# Patient Record
Sex: MALE | Race: BLACK OR AFRICAN AMERICAN | NOT HISPANIC OR LATINO | Employment: FULL TIME | URBAN - METROPOLITAN AREA
[De-identification: names, ages, dates, MRNs, and addresses within clinical notes are randomized per-mention and may not be internally consistent; named-entity substitution may affect disease eponyms.]

---

## 2018-01-11 NOTE — RESULT NOTES
Message   Remaining blood test results are back  Mono test, lyme disease test and throat culture are all negative  How is patient feeling? let me know, thanks     Verified Results  (1) THROAT CULTURE (CULTURE, UPPER RESPIRATORY) 22Sep2016 09:13PM [facility] Arianna     Test Name Result Flag Reference   CLINICAL REPORT (Report)     Test:        Throat culture  Specimen Type:   Throat  Specimen Date:   9/22/2016 9:13 PM  Result Date:    9/24/2016 9:30 AM  Result Status:   Final result  Resulting Lab:   [place] [address]            Tel: [phone]      CULTURE                                       ------------------                                   Negative for beta-hemolytic Streptococcus     (1) LYME ANTIBODY PROFILE W/REFLEX TO WESTERN BLOT 22Sep2016 05:20PM [facility] Arianna   TW Order Number: [id number]     Test Name Result Flag Reference   LYME IGG 0 12  0 00-0 79   NEGATIVE(0 00-0 79)-Absence of detectable Borrelia IgG Antibodies  A negative result does not exclude the possibility of Borrelia infection  If early Lyme disease is suspected,a second sample should be collected & tested 4 weeks after initial testing  LYME IGM 0 19  0 00-0 79   NEGATIVE (0 00-0 79)-Absence of detectable Borrelia IgM antibodies  A negative result does not exclude the possibility of Borrelia infection   If early lyme disease is suspected, a second sample should be collected & tested 4 weeks after initial testing      (1) MONO TEST 22Sep2016 05:20PM [facility] Arianna     Test Name Result Flag Reference   MONO TEST Negative  Negative

## 2018-01-11 NOTE — MISCELLANEOUS
Message   Recorded as Task   Date: 09/26/2016 09:00 AM, Created By: Jazzy Romero   Task Name: Call Patient with results   Assigned To: Jazzy Romero   Regarding Patient: Mora Boast, Status: In Progress   Comment:    Micky Sood - 26 Sep 2016 9:00 AM     Patient Phone: [phone]      Remaining blood test results are back  Mono test, lyme disease test and throat culture are all negative  How is patient feeling? let me know, thanks   Sheryl Heller - 26 Sep 2016 9:18 AM     UNDELEGATED TASK   Sheryl Heller - 26 Sep 2016 9:19 AM     TASK REASSIGNED: Previously Assigned To Taj Rod - 26 Sep 2016 10:01 AM     TASK IN PROGRESS   Gideon Car - 26 Sep 2016 10:01 AM     TASK EDITED  lmom to call back   Shabnam Crane - 26 Sep 2016 10:05 AM     TASK EDITED                 SPOKE W/ DAD AND ADV'D  DAD SAID HE COMPLAINED ON SAT  THAT HE WAS A BIT SLUGGISH   SAID HE NORMALY ISN'T TAHT ACTIVE THOUGH    called and spoke to patient    reviewed most recent test results with patient over phone    pt feeling the same - has little to no breaks 2nd to working and full-time school  feeling stressed "I thought it(stress) was the cause" & would like to take more breaks/rest time as he believes this will help him feel better  has discussed with parents in past but no time off scheduled in near future    not feeling depressed, no other complaints    plan - advised of need to take rest/breaks to recuperate, pt agrees   recommended Patient to schedule some time for rest in near future, pt will speak with parents and his work   advised if any concerns/questions - call or contact office anytime   pt verbalized understanding and agrees with plan/appreciative of phone call      Signatures   Electronically signed by : Roseanne Robertson DO; Sep 26 2016  2:42PM EST                       (Author)

## 2018-01-17 NOTE — PROGRESS NOTES
Chief Complaint  pt here for flu vaccine      Active Problems    1  Chronic pain of left ankle (046 42,656 10) (M25 572,G89 29)   2  Concussion With No Loss Of Consciousness (850 0)   3  Fatigue (780 79) (R53 83)   4  Feeling unwell (780 99) (R68 89)   5  Foot Sprain (845 10)   6  Injury, foot (959 7) (S99 929A)   7  Muscle ache (729 1) (M79 1)   8  Pain in left foot (729 5) (M79 672)   9  Screening for hyperlipidemia (V77 91) (Z13 220)    Current Meds   1  No Reported Medications Recorded    Allergies    1  No Known Drug Allergies    Assessment    1   Need for influenza vaccination (V04 81) (Z23)    Plan  Need for influenza vaccination    · Fluzone Quadrivalent Intramuscular Suspension    Signatures   Electronically signed by : Jayesh Salazar DO; Nov 16 2016  1:59PM EST                       (Author)

## 2018-04-30 ENCOUNTER — TELEPHONE (OUTPATIENT)
Dept: INTERNAL MEDICINE CLINIC | Facility: CLINIC | Age: 23
End: 2018-04-30

## 2018-04-30 ENCOUNTER — OFFICE VISIT (OUTPATIENT)
Dept: INTERNAL MEDICINE CLINIC | Facility: CLINIC | Age: 23
End: 2018-04-30
Payer: COMMERCIAL

## 2018-04-30 ENCOUNTER — TRANSCRIBE ORDERS (OUTPATIENT)
Dept: RADIOLOGY | Facility: HOSPITAL | Age: 23
End: 2018-04-30

## 2018-04-30 ENCOUNTER — HOSPITAL ENCOUNTER (OUTPATIENT)
Dept: RADIOLOGY | Facility: HOSPITAL | Age: 23
Discharge: HOME | End: 2018-04-30
Payer: COMMERCIAL

## 2018-04-30 VITALS
DIASTOLIC BLOOD PRESSURE: 72 MMHG | WEIGHT: 153.8 LBS | HEART RATE: 54 BPM | HEIGHT: 69 IN | SYSTOLIC BLOOD PRESSURE: 128 MMHG | OXYGEN SATURATION: 98 % | BODY MASS INDEX: 22.78 KG/M2 | RESPIRATION RATE: 16 BRPM

## 2018-04-30 DIAGNOSIS — M25.561 ACUTE PAIN OF RIGHT KNEE: Primary | ICD-10-CM

## 2018-04-30 DIAGNOSIS — M25.561 ACUTE PAIN OF RIGHT KNEE: ICD-10-CM

## 2018-04-30 PROCEDURE — 73564 X-RAY EXAM KNEE 4 OR MORE: CPT

## 2018-04-30 PROCEDURE — 99214 OFFICE O/P EST MOD 30 MIN: CPT | Performed by: INTERNAL MEDICINE

## 2018-04-30 RX ORDER — IBUPROFEN 400 MG/1
400 TABLET ORAL EVERY 12 HOURS
Qty: 30 TABLET | Refills: 0 | Status: SHIPPED | OUTPATIENT
Start: 2018-04-30 | End: 2018-08-29

## 2018-04-30 NOTE — LETTER
April 30, 2018     Patient: Vikki Eisenmenger II   YOB: 1995   Date of Visit: 4/30/2018       To Whom it May Concern:    Vikki Eisenmenger is under my professional care  He was seen in my office on 4/30/2018  Please excuse Jared Capellan from work on April 30, 2018 & May 1, 2018 for medical reasons  If you have any questions or concerns, please don't hesitate to call           Sincerely,          Andrés Alaniz, DO

## 2018-04-30 NOTE — PATIENT INSTRUCTIONS
1  Rest, analgesics  2  Motrin 400mg twice a day for 5 days -> take with food  3  Elastic knee brace  4  As pain is improving, try home stretching exercises as below  5  Call if not improving or if worsening    Knee Exercises   AMBULATORY CARE:   What you need to know about knee exercises:  Knee exercises help strengthen the muscles around your knee  Strong muscles can help reduce pain and decrease your risk of future injury  Knee exercises also help you heal after an injury or surgery  · Start slow  These are beginning exercises  Ask your healthcare provider if you need to see a physical therapist for more advanced exercises  As you get stronger, you may be able to do more sets of each exercise or add weights  · Stop if you feel pain  It is normal to feel some discomfort at first  Regular exercise will help decrease your discomfort over time  · Do the exercises on both legs  Do this so both knees remain strong  · Warm up before you do knee exercises  Walk or ride a stationary bike for 5 or 10 minutes to warm your muscles  How to perform knee stretches safely:  Always stretch before you do strengthening exercises  Do these stretching exercises again after you do the strengthening exercises  Do these stretches 4 or 5 days a week, or as directed  · Standing calf stretch: Face a wall and place both palms flat on the wall, or hold the back of a chair for balance  Keep a slight bend in your knees  Take a big step backward with one leg  Keep your other leg directly under you  Keep both heels flat and press your hips forward  Hold the stretch for 30 seconds, and then relax for 30 seconds  Switch legs  Repeat 2 or 3 times on each leg  · Standing quadriceps stretch:  Stand and place one hand against a wall or hold the back of a chair for balance  With your weight on one leg, bend your other leg and grab your ankle  Bring your heel toward your buttocks  Hold the stretch for 30 to 60 seconds  Switch legs  Repeat 2 or 3 times on each leg  · Sitting hamstring stretch:  Sit with both legs straight in front of you  Do not point or flex your toes  Place your palms on the floor and slide your hands forward until you feel the stretch  Do not round your back  Hold the stretch for 30 seconds  Repeat 2 or 3 times  How to perform knee strengthening exercises safely:  Do these exercises 4 or 5 days a week, or as directed  · Standing half squats:  Stand with your feet shoulder-width apart  Lean your back against a wall or hold the back of a chair for balance, if needed  Slowly sit down about 10 inches, as if you are going to sit in a chair  Your body weight should be mostly over your heels  Hold the squat for 5 seconds, then rise to a standing position  Do 3 sets of 10 squats to strengthen your buttocks and thighs  · Standing hamstring curls: Face a wall and place both palms flat on the wall, or hold the back of a chair for balance  With your weight on one leg, lift your other foot as close to your buttocks as you can  Hold for 5 seconds and then lower your leg  Do 2 sets of 10 curls on each leg  This exercise strengthens the muscles in the back of your thigh  · Standing calf raises:  Face a wall and place both palms flat on the wall, or hold the back of a chair for balance  Stand up straight, and do not lean  Place all your weight on one leg by lifting the other foot off the floor  Raise the heel of the foot that is on the floor as high as you can and then lower it  Do 2 sets of 10 calf raises on each leg to strengthen your calf muscles  · Straight leg lifts:  Lie on your stomach with straight legs  Fold your arms in front of you and rest your head in your arms  Tighten your leg muscles and raise one leg as high as you can  Hold for 5 seconds, then lower your leg  Do 2 sets of 10 lifts on each leg to strengthen your buttocks             · Sitting leg lifts:  Sit in a chair  Slowly straighten and raise one leg  Squeeze your thigh muscles and hold for 5 seconds  Relax and return your foot to the floor  Do 2 sets of 10 lifts on each leg  This helps strengthen the muscles in the front of your thigh  Contact your healthcare provider if:   · You have new pain or your pain becomes worse  · You have questions or concerns about your condition or care  © 2017 [address] [employer] Information is for End User's use only and may not be sold, redistributed or otherwise used for commercial purposes  All illustrations and images included in CareNotes® are the copyrighted property of [employer]  or Tyrese Morrow  The above information is an  only  It is not intended as medical advice for individual conditions or treatments  Talk to your doctor, nurse or pharmacist before following any medical regimen to see if it is safe and effective for you

## 2018-04-30 NOTE — TELEPHONE ENCOUNTER
----- Message from Melba Bradshaw DO sent at 4/30/2018  4:10 PM EDT -----  Knee x-ray results are back and look fine, no degenerative changes or injury    Continue with current tx plan

## 2018-08-29 ENCOUNTER — OFFICE VISIT (OUTPATIENT)
Dept: INTERNAL MEDICINE CLINIC | Facility: CLINIC | Age: 23
End: 2018-08-29
Payer: COMMERCIAL

## 2018-08-29 VITALS
OXYGEN SATURATION: 97 % | DIASTOLIC BLOOD PRESSURE: 80 MMHG | RESPIRATION RATE: 16 BRPM | HEART RATE: 70 BPM | HEIGHT: 69 IN | TEMPERATURE: 98.7 F | BODY MASS INDEX: 22.07 KG/M2 | SYSTOLIC BLOOD PRESSURE: 126 MMHG | WEIGHT: 149 LBS

## 2018-08-29 DIAGNOSIS — Z13.6 ENCOUNTER FOR LIPID SCREENING FOR CARDIOVASCULAR DISEASE: ICD-10-CM

## 2018-08-29 DIAGNOSIS — Z13.220 ENCOUNTER FOR LIPID SCREENING FOR CARDIOVASCULAR DISEASE: ICD-10-CM

## 2018-08-29 DIAGNOSIS — Z02.89 ENCOUNTER FOR COMPLETION OF FORM WITH PATIENT: ICD-10-CM

## 2018-08-29 DIAGNOSIS — Z00.00 WELLNESS EXAMINATION: Primary | ICD-10-CM

## 2018-08-29 PROCEDURE — 99395 PREV VISIT EST AGE 18-39: CPT | Performed by: INTERNAL MEDICINE

## 2018-08-29 NOTE — PROGRESS NOTES
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, went to [facility] today and told needs form completed, called office and scheduled for [place] this morning  Knee is feeling better no more pain, took excedrin and not motrin  Starting college in fall and may need medical letter stating he can return to school  Was at [employer] and finished his associate's degree but left during semester of last term, reports due to being too focused on working & making money(had 2-3 jobs at time of school)  Denies hx of seizure, alcohol problems, depression/DM  No other complaints  History reviewed  No pertinent past medical history  Vitals:    08/29/18 0942   BP: 126/80   Pulse: 70   Resp: 16   Temp: 98 7 °F (37 1 °C)   SpO2: 97%   Weight: 67 6 kg (149 lb)   Height: 5' 9" (1 753 m)     Body mass index is 22 kg/m²  No current outpatient prescriptions on file  No Known Allergies      Review of Systems   Constitutional: Negative for fever  HENT: Negative for congestion  Eyes: Negative for visual disturbance  Respiratory: Negative for shortness of breath  Cardiovascular: Negative for chest pain  Gastrointestinal: Negative for abdominal pain  Genitourinary: Negative for difficulty urinating  Musculoskeletal: Negative for arthralgias  Neurological: Negative for headaches  Psychiatric/Behavioral: Negative for dysphoric mood           Objective:      /80   Pulse 70   Temp 98 7 °F (37 1 °C)   Resp 16   Ht 5' 9" (1 753 m)   Wt 67 6 kg (149 lb)   SpO2 97%   BMI 22 00 kg/m²          Physical Exam   Constitutional: He appears well-developed and well-nourished  HENT:   Head: Normocephalic and atraumatic  Mouth/Throat: Oropharynx is clear and moist    Eyes: Conjunctivae are normal  Pupils are equal, round, and reactive to light  Cardiovascular: Normal rate, regular rhythm and normal heart sounds  No murmur heard  Pulmonary/Chest: Effort normal and breath sounds normal  He has no wheezes  He has no rales  Abdominal: Soft  Bowel sounds are normal  There is no tenderness  Musculoskeletal: He exhibits no edema  Neurological: He is alert  He has normal strength  Psychiatric: He has a normal mood and affect  His behavior is normal    Vitals reviewed

## 2018-10-29 ENCOUNTER — CLINICAL SUPPORT (OUTPATIENT)
Dept: INTERNAL MEDICINE CLINIC | Facility: CLINIC | Age: 23
End: 2018-10-29
Payer: COMMERCIAL

## 2018-10-29 DIAGNOSIS — Z23 FLU VACCINE NEED: Primary | ICD-10-CM

## 2018-10-29 PROCEDURE — 90686 IIV4 VACC NO PRSV 0.5 ML IM: CPT

## 2018-10-29 PROCEDURE — 90471 IMMUNIZATION ADMIN: CPT

## 2019-02-06 ENCOUNTER — OFFICE VISIT (OUTPATIENT)
Dept: INTERNAL MEDICINE CLINIC | Facility: CLINIC | Age: 24
End: 2019-02-06
Payer: COMMERCIAL

## 2019-02-06 VITALS
SYSTOLIC BLOOD PRESSURE: 122 MMHG | OXYGEN SATURATION: 96 % | DIASTOLIC BLOOD PRESSURE: 80 MMHG | TEMPERATURE: 98.8 F | HEIGHT: 69 IN | BODY MASS INDEX: 23.28 KG/M2 | RESPIRATION RATE: 18 BRPM | WEIGHT: 157.2 LBS | HEART RATE: 94 BPM

## 2019-02-06 DIAGNOSIS — R68.83 CHILLS (WITHOUT FEVER): ICD-10-CM

## 2019-02-06 DIAGNOSIS — R61 NIGHT SWEATS: ICD-10-CM

## 2019-02-06 DIAGNOSIS — M54.50 ACUTE BILATERAL LOW BACK PAIN WITHOUT SCIATICA: Primary | ICD-10-CM

## 2019-02-06 LAB — S PYO AG THROAT QL: NEGATIVE

## 2019-02-06 PROCEDURE — 1036F TOBACCO NON-USER: CPT | Performed by: INTERNAL MEDICINE

## 2019-02-06 PROCEDURE — 87880 STREP A ASSAY W/OPTIC: CPT | Performed by: INTERNAL MEDICINE

## 2019-02-06 PROCEDURE — 3008F BODY MASS INDEX DOCD: CPT | Performed by: INTERNAL MEDICINE

## 2019-02-06 PROCEDURE — 99214 OFFICE O/P EST MOD 30 MIN: CPT | Performed by: INTERNAL MEDICINE

## 2019-02-06 NOTE — PATIENT INSTRUCTIONS
1  Fluids  2  Physical therapy & ibuprofen 400mg twice a day for next 3-4 days(take with food)  3  Blood work  4  Call if symptoms/pain not improving or worsening  5  See information below    Acute Low Back Pain   AMBULATORY CARE:   Acute low back pain  is sudden discomfort in your lower back area that lasts for up to 6 weeks  The discomfort makes it difficult to tolerate activity  Common symptoms include the following:   · Back stiffness or spasms    · Pain down the back or side of one leg    · Holding yourself in an unusual position or posture to decrease your back pain    · Not being able to find a sitting position that is comfortable    · Slow increase in your pain for 24 to 48 hours after you stress your back    · Tenderness on your lower back or severe pain when you move your back  Seek immediate care for the following symptoms:   · Severe pain    · Sudden stiffness and heaviness in both buttocks down to both legs    · Numbness or weakness in one leg, or pain in both legs    · Numbness in your genital area or across your lower back    · Unable to control your urine or bowel movements  Contact your healthcare provider if:   · You have a fever  · You have pain at night or when you rest     · Your pain does not get better with treatment  · You have pain that worsens when you cough or sneeze  · You suddenly feel something pop or snap in your back  · You have questions or concerns about your condition or care  The goal of treatment for acute low back pain  is to relieve your pain and help you tolerate activity  Most people with acute lower back pain get better within 4 to 6 weeks  You may need any of the following:  · Acetaminophen  decreases pain  It is available without a doctor's order  Ask how much to take and how often to take it  Follow directions  Acetaminophen can cause liver damage if not taken correctly  · NSAIDs  help decrease swelling and pain   This medicine is available with or without a doctor's order  NSAIDs can cause stomach bleeding or kidney problems in certain people  If you take blood thinner medicine, always ask your healthcare provider if NSAIDs are safe for you  Always read the medicine label and follow directions  · Prescription pain medicine  may be given  Ask your healthcare provider how to take this medicine safely  · Muscle relaxers  decrease pain by relaxing the muscles in your lower spine  Manage your symptoms:   · Stay active  as much as you can without causing more pain  Bed rest could make your back pain worse  Start with some light exercises such as walking  Avoid heavy lifting until your pain is gone  Ask for more information about the activities or exercises that are right for you  · Ice  helps decrease swelling, pain, and muscle spams  Put crushed ice in a plastic bag  Cover it with a towel  Place it on your lower back for 20 to 30 minutes every 2 hours  Do this for about 2 to 3 days after your pain starts, or as directed  · Heat  helps decrease pain and muscle spasms  Start to use heat after treatment with ice has stopped  Use a small towel dampened with warm water or a heating pad, or sit in a warm bath  Apply heat on the area for 20 to 30 minutes every 2 hours for as many days as directed  Alternate heat and ice  Prevent acute low back pain:   · Use proper body mechanics  ¨ Bend at the hips and knees when you  objects  Do not bend from the waist  Use your leg muscles as you lift the load  Do not use your back  Keep the object close to your chest as you lift it  Try not to twist or lift anything above your waist     ¨ Change your position often when you stand for long periods of time  Rest one foot on a small box or footrest, and then switch to the other foot often  ¨ Try not to sit for long periods of time  When you do, sit in a straight-backed chair with your feet flat on the floor   Never reach, pull, or push while you are sitting  · Do exercises that strengthen your back muscles  Warm up before you exercise  Ask your healthcare provider the best exercises for you  · Maintain a healthy weight  Ask your healthcare provider how much you should weigh  Ask him to help you create a weight loss plan if you are overweight  Follow up with your healthcare provider as directed:  Return for a follow-up visit if you still have pain after 1 to 3 weeks of treatment  You may need to visit an orthopedist if your back pain lasts more than 12 weeks  Write down your questions so you remember to ask them during your visits  © 2017 [facility] Information is for End User's use only and may not be sold, redistributed or otherwise used for commercial purposes  All illustrations and images included in CareNotes® are the copyrighted property of [employer]  or Tyrese Morrow  The above information is an  only  It is not intended as medical advice for individual conditions or treatments  Talk to your doctor, nurse or pharmacist before following any medical regimen to see if it is safe and effective for you

## 2019-02-06 NOTE — PROGRESS NOTES
Assessment/Plan:     Diagnoses and all orders for this visit:    Acute bilateral low back pain without sciatica  Comments:  symptoms more c/w lumbar strain, advised rest/nsaids and PT   pt advised to call me if symptoms worsen or do not improve  Orders:  -     Ambulatory referral to Physical Therapy; Future    Chills (without fever)  Comments:  intermittent, transient-> lasting moments at a time  physical exam unremarkable for cause of chills  BW ordered and precuations advised  Orders:  -     CBC and differential  -     Comprehensive metabolic panel; Future  -     POCT rapid strepA    Night sweats  -     CBC and differential  -     Comprehensive metabolic panel; Future  -     POCT rapid strepA          Subjective:      Patient ID: Love Maher II is a 26 y o  male  HPI    Here with c/o intermittent sweats in evening and momentary chill off/on for last couple of weeks  Also having occ sore throat but no cough/congestion/runny nose/fever/SOB/CP/abd pain/N/V/D/dysuria  No weight loss, joint swelling, rashes or sick contacts and otherwise feels fine except for lower back pain for last 1-2 weeks but no fall/injury or radicular pain  Pain worse in morning and improves by later in the day  Not taking any rx for pain  Able to do usual ADLs despite back pains  No hx of back problems in past   Mother made him come in for appt today  No other complaints  History reviewed  No pertinent past medical history  Vitals:    02/06/19 1322   BP: 122/80   Pulse: 94   Resp: 18   Temp: 98 8 °F (37 1 °C)   TempSrc: Oral   SpO2: 96%   Weight: 71 3 kg (157 lb 3 2 oz)   Height: 5' 9" (1 753 m)     Body mass index is 23 21 kg/m²  No current outpatient prescriptions on file  No Known Allergies      Review of Systems   Constitutional: Positive for diaphoresis  Negative for fever and unexpected weight change  HENT: Positive for sore throat  Negative for congestion, postnasal drip and sinus pressure      Eyes: Negative for visual disturbance  Respiratory: Negative for cough, shortness of breath and wheezing  Cardiovascular: Negative for chest pain  Gastrointestinal: Negative for abdominal pain, constipation and diarrhea  Endocrine: Negative for polyuria  Genitourinary: Negative for dysuria  Musculoskeletal: Negative for arthralgias and joint swelling  Skin: Negative for rash  Allergic/Immunologic: Negative for immunocompromised state  Neurological: Negative for headaches  Hematological: Does not bruise/bleed easily  Psychiatric/Behavioral: Negative for dysphoric mood  Objective:      /80   Pulse 94   Temp 98 8 °F (37 1 °C) (Oral)   Resp 18   Ht 5' 9" (1 753 m)   Wt 71 3 kg (157 lb 3 2 oz)   SpO2 96%   BMI 23 21 kg/m²          Physical Exam   Constitutional: He appears well-developed and well-nourished  HENT:   Head: Normocephalic and atraumatic  Right Ear: Tympanic membrane normal    Left Ear: Tympanic membrane normal    Nose: Right sinus exhibits no maxillary sinus tenderness and no frontal sinus tenderness  Left sinus exhibits no maxillary sinus tenderness and no frontal sinus tenderness  Mouth/Throat: Oropharynx is clear and moist  No posterior oropharyngeal erythema  Eyes: Pupils are equal, round, and reactive to light  Conjunctivae are normal    Neck: Neck supple  Cardiovascular: Normal rate, regular rhythm and normal heart sounds  No murmur heard  Pulmonary/Chest: Effort normal and breath sounds normal  He has no wheezes  He has no rales  Abdominal: Soft  Bowel sounds are normal  There is no tenderness  Musculoskeletal: He exhibits no edema  SLR neg for LBP b/l, no paraspinal or spinal tenderness but mild paraspinal muscle spasm in lumbar musculature b/l  Lymphadenopathy:     He has no cervical adenopathy  Neurological: He is alert  Motor 5/5 LE b/l   Skin: Skin is warm and dry  Psychiatric: He has a normal mood and affect   His behavior is normal    Vitals reviewed

## 2019-02-20 ENCOUNTER — TELEPHONE (OUTPATIENT)
Dept: INTERNAL MEDICINE CLINIC | Facility: CLINIC | Age: 24
End: 2019-02-20

## 2019-02-20 NOTE — TELEPHONE ENCOUNTER
Josh Alston think this is the wrong patient    Please make necessary changes and send message again    thx

## 2019-02-20 NOTE — TELEPHONE ENCOUNTER
Pt  Asking for his colonscopy what meds does he stop and for his hernia surgery whaot meds does he stop? Also, for his coloscopy does he stop Plavix 5 days before procedure?

## 2019-02-20 NOTE — TELEPHONE ENCOUNTER
Please disregard this message  Wrong patient, wrong provider and these issue has already been handle I spoke with Carolina Boo all morning, it is in the encounters

## 2019-08-26 ENCOUNTER — APPOINTMENT (OUTPATIENT)
Dept: RADIOLOGY | Facility: AMBULARY SURGERY CENTER | Age: 24
End: 2019-08-26
Payer: COMMERCIAL

## 2019-08-26 DIAGNOSIS — S92.155S CLOSED NONDISPLACED AVULSION FRACTURE OF LEFT TALUS, SEQUELA: Primary | ICD-10-CM

## 2019-08-26 DIAGNOSIS — M76.822 POSTERIOR TIBIAL TENDONITIS, LEFT: ICD-10-CM

## 2019-08-26 DIAGNOSIS — M25.572 PAIN, JOINT, ANKLE AND FOOT, LEFT: ICD-10-CM

## 2019-08-26 DIAGNOSIS — Q66.72 PES CAVUS OF LEFT FOOT: ICD-10-CM

## 2019-08-26 PROBLEM — S92.155A CLOSED NONDISPLACED AVULSION FRACTURE OF LEFT TALUS: Status: ACTIVE | Noted: 2019-08-26

## 2019-08-26 PROCEDURE — 73610 X-RAY EXAM OF ANKLE: CPT

## 2019-08-26 PROCEDURE — 99203 OFFICE O/P NEW LOW 30 MIN: CPT | Performed by: ORTHOPAEDIC SURGERY

## 2019-08-26 NOTE — PROGRESS NOTES
Assessment/Plan:  1  Closed nondisplaced avulsion fracture of left talus, sequela     2  Pain, joint, ankle and foot, left     3  Pes cavus of left foot     4  Posterior tibial tendonitis, left       Patient Active Problem List   Diagnosis    Chronic pain of left ankle    Concussion with no loss of consciousness    Posterior tibial tendonitis, left    Pain, joint, ankle and foot, left    Pes cavus of left foot    Closed nondisplaced avulsion fracture of left talus       Discussion/Summary:    27 y o  male  Left foot and ankle pain due to mechanical issues from bony prominence after a Talar dorsal avulsion fracture over 3 years ago  He will be sent to Dr Stefan Garcia for this to determine if  Surgical  Removal of bony prominence over the dorsal talus would be worth risk of recurrence or pain from scar tissue  For the posterior tibial tendinitis and pes cavus will send him to physical therapy with [facility] for a custom orthotic  He will follow up with us on as-needed basis  The patient was seen and examined by Dr Stan Del Cid and myself  The assessment and plan were formulated by Dr Stan Del Cid and I assisted in carrying it out  Subjective:   Patient ID: Doris Velez is a 27 y o  male   HPI    Patient presents to the office complaining of  Left ankle pain  He did have history of dorsal talar avulsion fracture over 3 years ago was treated non operatively  Still has pain over the dorsal foot there that is worse with running and plantar flexion specially  That pain has been present and intermittent since the past 2 years after he finished physical therapy and therapy a Cam walker boot  He began having pain in the medial plantar aspect of the foot and near the heel yesterday  He reports this happened after running  The pain does radiate near the medial malleolus  He   Describes pain is sharp, intermittent    Pain is 5-6 at 10 at worst, / 10 at best   Made worse with eversion and plantar flexion, better with rest and elevation  He has tried icing  As well with minimal relief  Denies any numbness or tingling currently  No problems like this in the past     The following portions of the patient's history were reviewed and updated as appropriate: allergies, current medications, past family history, past social history, past surgical history and problem list     Social History     Socioeconomic History    Marital status: Single     Spouse name: Not on file    Number of children: Not on file    Years of education: Not on file    Highest education level: Not on file   Occupational History     Employer: [employer]   Social Needs    Financial resource strain: Not on file    Food insecurity:     Worry: Not on file     Inability: Not on file    Transportation needs:     Medical: Not on file     Non-medical: Not on file   Tobacco Use    Smoking status: Never Smoker    Smokeless tobacco: Never Used   Substance and Sexual Activity    Alcohol use: No    Drug use: No    Sexual activity: Not on file   Lifestyle    Physical activity:     Days per week: Not on file     Minutes per session: Not on file    Stress: Not on file   Relationships    Social connections:     Talks on phone: Not on file     Gets together: Not on file     Attends Anabaptist service: Not on file     Active member of club or organization: Not on file     Attends meetings of clubs or organizations: Not on file     Relationship status: Not on file    Intimate partner violence:     Fear of current or ex partner: Not on file     Emotionally abused: Not on file     Physically abused: Not on file     Forced sexual activity: Not on file   Other Topics Concern    Not on file   Social History Narrative    Not on file     No past medical history on file  Past Surgical History:   Procedure Laterality Date    NO PAST SURGERIES       No Known Allergies  No current outpatient medications on file prior to visit       No current facility-administered medications on file prior to visit  Review of Systems   Constitutional: Negative for chills, fever and unexpected weight change  HENT: Negative for hearing loss, nosebleeds and sore throat  Eyes: Negative for pain, redness and visual disturbance  Respiratory: Negative for cough, shortness of breath and wheezing  Cardiovascular: Negative for chest pain, palpitations and leg swelling  Gastrointestinal: Negative for abdominal pain, nausea and vomiting  Endocrine: Negative for polydipsia and polyuria  Genitourinary: Negative for dysuria and hematuria  Musculoskeletal:          As noted in HPI   Skin: Negative for rash and wound  Neurological: Negative for dizziness, numbness and headaches  Psychiatric/Behavioral: Negative for decreased concentration, dysphoric mood and suicidal ideas  The patient is not nervous/anxious  Objective: There were no vitals filed for this visit  Physical Exam   Constitutional: He is oriented to person, place, and time  He appears well-developed and well-nourished  No distress  HENT:   Head: Normocephalic and atraumatic  Eyes: Conjunctivae are normal  No scleral icterus  Neck: Neck supple  No tracheal deviation present  Cardiovascular:   No discernible arrhthymias    Pulmonary/Chest: Effort normal  No respiratory distress  Neurological: He is alert and oriented to person, place, and time  Skin: Skin is warm and dry  Psychiatric: He has a normal mood and affect  His behavior is normal        Left Ankle Exam     Tenderness   Left ankle tenderness location:  there is tenderness over the posterior tibial tendon by the medial malleolus as well as at the navicular region  No tenderness over the plantar fascia  Mild tenderness over the dorsal talus palpable  bony prominence there     Swelling: none    Range of Motion   Dorsiflexion: normal   Plantar flexion: normal   Eversion: normal   Inversion: normal     Muscle Strength   Left ankle normal muscle strength: FHL and EHL 5/5  Dorsiflexion:  5/5   Plantar flexion:  5/5   Anterior tibial:  5/5   Posterior tibial:  5/5  Gastrocsoleus:  5/5  Peroneal muscle:  5/5    Tests   Anterior drawer: negative  Varus tilt: negative    Other   Erythema: absent  Scars: absent  Sensation: normal  Left ankle pulse absent: 2+ DP pulse  Comments: There is pain but no instability with single leg raise on the left foot   pes cavus is noted            I have personally reviewed pertinent films in PACS and my interpretation is   X-ray of the left ankle demonstrates a healed dorsal  talus avulsion fracture there is a bony prominence now in the area with the fracture fragment has healed  no acute fractures  No degenerative changes  He does have a small osteophyte however likely at the calcaneocuboid junction  Procedures  No Procedures performed today    Portions of the record may have been created with voice recognition software  Occasional wrong word or "sound a like" substitutions may have occurred due to the inherent limitations of voice recognition software  Read the chart carefully and recognize, using context, where substitutions have occurred

## 2019-10-05 ENCOUNTER — CLINICAL SUPPORT (OUTPATIENT)
Dept: INTERNAL MEDICINE CLINIC | Facility: CLINIC | Age: 24
End: 2019-10-05
Payer: COMMERCIAL

## 2019-10-05 DIAGNOSIS — Z23 ENCOUNTER FOR IMMUNIZATION: ICD-10-CM

## 2019-10-05 PROCEDURE — 90686 IIV4 VACC NO PRSV 0.5 ML IM: CPT

## 2019-10-05 PROCEDURE — 90471 IMMUNIZATION ADMIN: CPT

## 2020-01-13 ENCOUNTER — OFFICE VISIT (OUTPATIENT)
Dept: INTERNAL MEDICINE CLINIC | Facility: CLINIC | Age: 25
End: 2020-01-13
Payer: COMMERCIAL

## 2020-01-13 VITALS
OXYGEN SATURATION: 98 % | SYSTOLIC BLOOD PRESSURE: 126 MMHG | HEIGHT: 69 IN | RESPIRATION RATE: 18 BRPM | HEART RATE: 83 BPM | TEMPERATURE: 98.3 F | DIASTOLIC BLOOD PRESSURE: 80 MMHG | BODY MASS INDEX: 23.79 KG/M2 | WEIGHT: 160.6 LBS

## 2020-01-13 DIAGNOSIS — J00 ACUTE RHINITIS: Primary | ICD-10-CM

## 2020-01-13 PROCEDURE — 3008F BODY MASS INDEX DOCD: CPT | Performed by: INTERNAL MEDICINE

## 2020-01-13 PROCEDURE — 1036F TOBACCO NON-USER: CPT | Performed by: INTERNAL MEDICINE

## 2020-01-13 PROCEDURE — 99213 OFFICE O/P EST LOW 20 MIN: CPT | Performed by: INTERNAL MEDICINE

## 2020-01-13 NOTE — PROGRESS NOTES
Assessment/Plan:    No problem-specific Assessment & Plan notes found for this encounter  Diagnoses and all orders for this visit:    Acute rhinitis  Comments:  Use saline nasal spray prn, steam bath prn  Increase daily fluids, monitor for fevers  Follow up as scheduled or as needed  Subjective:      Patient ID: Marylene Frankel is a 27 y o  male  Elizabet Padgetter has been feeling tired the past few days  He reports occasional nasal congestion postnasal drip, occasional nonproductive cough  He reports having some chills in the evening, no documented fevers  He denies any headache or dizziness, no GI symptoms  No sick contacts  He was off today, did not need to go to work  He did go to the gym yesterday, felt well afterwards  He has not taken any over-the-counter medication  He thinks he drinks enough water daily  He recalls having some nausea few days ago, this has since resolved  No history of asthma or allergies  The following portions of the patient's history were reviewed and updated as appropriate: allergies, current medications, past medical history, past social history and problem list     Review of Systems   Constitutional: Positive for chills and fatigue  Negative for activity change and appetite change  HENT: Positive for congestion  Negative for ear pain, rhinorrhea, sinus pressure, sinus pain and sore throat  Respiratory: Negative for cough, shortness of breath and wheezing  Cardiovascular: Negative for chest pain  Gastrointestinal: Negative for abdominal pain, diarrhea, nausea and vomiting  Genitourinary: Negative for difficulty urinating  Musculoskeletal: Negative for arthralgias and myalgias  Neurological: Negative for dizziness and headaches  Psychiatric/Behavioral: Negative for sleep disturbance           Objective:      /80   Pulse 83   Temp 98 3 °F (36 8 °C) (Oral)   Resp 18   Ht 5' 9" (1 753 m)   Wt 72 8 kg (160 lb 9 6 oz)   SpO2 98%   BMI 23 72 kg/m²          Physical Exam   Constitutional: He appears well-developed  HENT:   Head: Normocephalic  Right Ear: External ear and ear canal normal    Left Ear: External ear and ear canal normal    Nose: Mucosal edema and rhinorrhea present  Right sinus exhibits no maxillary sinus tenderness  Left sinus exhibits no maxillary sinus tenderness  Mouth/Throat: Oropharynx is clear and moist and mucous membranes are normal    Impacted cerumen, bilateral   Eyes: Pupils are equal, round, and reactive to light  Neck: Normal range of motion  Cardiovascular: Normal rate and regular rhythm  Pulmonary/Chest: Effort normal and breath sounds normal  He has no wheezes  He has no rales  Abdominal: Soft  Lymphadenopathy:     He has no cervical adenopathy  Neurological: He is alert  Nursing note and vitals reviewed

## 2020-08-25 ENCOUNTER — OFFICE VISIT (OUTPATIENT)
Dept: INTERNAL MEDICINE CLINIC | Facility: CLINIC | Age: 25
End: 2020-08-25
Payer: COMMERCIAL

## 2020-08-25 VITALS
DIASTOLIC BLOOD PRESSURE: 76 MMHG | BODY MASS INDEX: 24.44 KG/M2 | TEMPERATURE: 98 F | HEART RATE: 73 BPM | WEIGHT: 165 LBS | OXYGEN SATURATION: 98 % | HEIGHT: 69 IN | SYSTOLIC BLOOD PRESSURE: 128 MMHG

## 2020-08-25 DIAGNOSIS — F41.9 ANXIETY: Primary | ICD-10-CM

## 2020-08-25 PROCEDURE — 3008F BODY MASS INDEX DOCD: CPT | Performed by: NURSE PRACTITIONER

## 2020-08-25 PROCEDURE — 99213 OFFICE O/P EST LOW 20 MIN: CPT | Performed by: NURSE PRACTITIONER

## 2020-08-25 PROCEDURE — 1036F TOBACCO NON-USER: CPT | Performed by: NURSE PRACTITIONER

## 2020-08-25 NOTE — PROGRESS NOTES
Assessment/Plan:    Anxiety  Discussed treatment options for anxiety including counseling, medications, and self-care  He declined any medications or counseling  Provided resources for meditation apps and [employer] online self help program  Encouraged him to exercise, stay well hydrated, eat healthy, and practice good sleep hygiene habits  Call if anxiety worsens or does not improve  Diagnoses and all orders for this visit:    Anxiety  -     Comprehensive metabolic panel; Future  -     CBC and differential; Future  -     TSH, 3rd generation with Free T4 reflex; Future          Subjective:      Patient ID: Maria Dolores Van is a 23 y o  male  Here today with complaints of increased anxiety   Gale Blackman has been feeling anxious for the last few months   Sometimes he feels chest tightness and his legs get weak when he's anxious  He has periods where he doesn't sleep well and feels jittery   He can't link it to increased stressors at home but he does feel stressed overall   He does not drink any caffeine  No alcohol, smoking or drug use  He denies any suicidal thoughts  The following portions of the patient's history were reviewed and updated as appropriate: allergies, current medications, past family history, past medical history, past social history, past surgical history and problem list     Review of Systems   Constitutional: Negative for activity change, appetite change and fatigue  Respiratory: Positive for chest tightness  Negative for cough and shortness of breath  Cardiovascular: Negative for chest pain, palpitations and leg swelling  Gastrointestinal: Negative for abdominal pain, diarrhea, nausea and vomiting  Genitourinary: Negative for difficulty urinating  Neurological: Negative for dizziness, light-headedness and headaches  Psychiatric/Behavioral: Positive for decreased concentration and sleep disturbance  Negative for agitation, dysphoric mood and suicidal ideas   The patient is nervous/anxious  Objective:      /76   Pulse 73   Temp 98 °F (36 7 °C)   Ht 5' 9" (1 753 m)   Wt 74 8 kg (165 lb)   SpO2 98%   BMI 24 37 kg/m²          Physical Exam  Vitals signs reviewed  Constitutional:       Appearance: Normal appearance  HENT:      Head: Normocephalic and atraumatic  Eyes:      Conjunctiva/sclera: Conjunctivae normal       Pupils: Pupils are equal, round, and reactive to light  Cardiovascular:      Rate and Rhythm: Normal rate and regular rhythm  Pulses: Normal pulses  Heart sounds: Normal heart sounds  Pulmonary:      Effort: Pulmonary effort is normal  No respiratory distress  Breath sounds: Normal breath sounds  Musculoskeletal: Normal range of motion  Skin:     General: Skin is warm and dry  Neurological:      Mental Status: He is alert and oriented to person, place, and time     Psychiatric:         Mood and Affect: Mood normal          Behavior: Behavior normal

## 2020-08-25 NOTE — PATIENT INSTRUCTIONS
Try medication 15-20 minutes daily- recommended apps include the mindfulness, headspace, or calm   Also recommend trying [employer] program online- [facility]/    Anxiety   WHAT YOU NEED TO KNOW:   Anxiety is a condition that causes you to feel extremely worried or nervous  The feelings are so strong that they can cause problems with your daily activities or sleep  Anxiety may be triggered by something you fear, or it may happen without a cause  Family or work stress, smoking, caffeine, and alcohol can increase your risk for anxiety  Certain medicines or health conditions can also increase your risk  Anxiety can become a long-term condition if it is not managed or treated  DISCHARGE INSTRUCTIONS:   Call 911 if:   · You have chest pain, tightness, or heaviness that may spread to your shoulders, arms, jaw, neck, or back  · You feel like hurting yourself or someone else  Contact your healthcare provider if:   · Your symptoms get worse or do not get better with treatment  · Your anxiety keeps you from doing your regular daily activities  · You have new symptoms since your last visit  · You have questions or concerns about your condition or care  Medicines:   · Medicines  may be given to help you feel more calm and relaxed, and decrease your symptoms  · Take your medicine as directed  Contact your healthcare provider if you think your medicine is not helping or if you have side effects  Tell him of her if you are allergic to any medicine  Keep a list of the medicines, vitamins, and herbs you take  Include the amounts, and when and why you take them  Bring the list or the pill bottles to follow-up visits  Carry your medicine list with you in case of an emergency  Follow up with your healthcare provider within 2 weeks or as directed:  Write down your questions so you remember to ask them during your visits  Manage anxiety:   · Talk to someone about your anxiety    Your healthcare provider may suggest counseling  Cognitive behavioral therapy can help you understand and change how you react to events that trigger your symptoms  You might feel more comfortable talking with a friend or family member about your anxiety  Choose someone you know will be supportive and encouraging  · Find ways to relax  Activities such as exercise, meditation, or listening to music can help you relax  Spend time with friends, or do things you enjoy  · Practice deep breathing  Deep breathing can help you relax when you feel anxious  Focus on taking slow, deep breaths several times a day, or during an anxiety attack  Breathe in through your nose and out through your mouth  · Create a regular sleep routine  Regular sleep can help you feel calmer during the day  Go to sleep and wake up at the same times every day  Do not watch television or use the computer right before bed  Your room should be comfortable, dark, and quiet  · Eat a variety of healthy foods  Healthy foods include fruits, vegetables, low-fat dairy products, lean meats, fish, whole-grain breads, and cooked beans  Healthy foods can help you feel less anxious and have more energy  · Exercise regularly  Exercise can increase your energy level  Exercise may also lift your mood and help you sleep better  Your healthcare provider can help you create an exercise plan  · Do not smoke  Nicotine and other chemicals in cigarettes and cigars can increase anxiety  Ask your healthcare provider for information if you currently smoke and need help to quit  E-cigarettes or smokeless tobacco still contain nicotine  Talk to your healthcare provider before you use these products  · Do not have caffeine  Caffeine can make your symptoms worse  Do not have foods or drinks that are meant to increase your energy level  · Limit or do not drink alcohol  Ask your healthcare provider if alcohol is safe for you   You may not be able to drink alcohol if you take certain anxiety or depression medicines  Limit alcohol to 1 drink per day if you are a woman  Limit alcohol to 2 drinks per day if you are a man  A drink of alcohol is 12 ounces of beer, 5 ounces of wine, or 1½ ounces of liquor  · Do not use drugs  Drugs can make your anxiety worse  It can also make anxiety hard to manage  Talk to your healthcare provider if you use drugs and want help to quit  © 2017 [facility] Information is for End User's use only and may not be sold, redistributed or otherwise used for commercial purposes  All illustrations and images included in CareNotes® are the copyrighted property of [employer] M [employer] Inc  or Tyrese Morrow  The above information is an  only  It is not intended as medical advice for individual conditions or treatments  Talk to your doctor, nurse or pharmacist before following any medical regimen to see if it is safe and effective for you

## 2020-08-25 NOTE — ASSESSMENT & PLAN NOTE
Discussed treatment options for anxiety including counseling, medications, and importance of self-care routine  He declined any medications or counseling at this time  Provided resources for meditation apps and [employer] online self help program  Encouraged him to exercise, stay well hydrated, eat healthy, and practice good sleep hygiene habits  Call if anxiety worsens or does not improve

## 2020-08-28 ENCOUNTER — APPOINTMENT (OUTPATIENT)
Dept: LAB | Facility: CLINIC | Age: 25
End: 2020-08-28
Payer: COMMERCIAL

## 2020-08-28 ENCOUNTER — TRANSCRIBE ORDERS (OUTPATIENT)
Dept: LAB | Facility: CLINIC | Age: 25
End: 2020-08-28

## 2020-08-28 DIAGNOSIS — F41.9 ANXIETY: ICD-10-CM

## 2020-08-28 LAB
ALBUMIN SERPL BCP-MCNC: 4.5 G/DL (ref 3.5–5)
ALP SERPL-CCNC: 38 U/L (ref 46–116)
ALT SERPL W P-5'-P-CCNC: 23 U/L (ref 12–78)
ANION GAP SERPL CALCULATED.3IONS-SCNC: 7 MMOL/L (ref 4–13)
AST SERPL W P-5'-P-CCNC: 15 U/L (ref 5–45)
BASOPHILS # BLD AUTO: 0.04 THOUSANDS/ΜL (ref 0–0.1)
BASOPHILS NFR BLD AUTO: 1 % (ref 0–1)
BILIRUB SERPL-MCNC: 0.26 MG/DL (ref 0.2–1)
BUN SERPL-MCNC: 17 MG/DL (ref 5–25)
CALCIUM SERPL-MCNC: 9.1 MG/DL (ref 8.3–10.1)
CHLORIDE SERPL-SCNC: 105 MMOL/L (ref 100–108)
CO2 SERPL-SCNC: 29 MMOL/L (ref 21–32)
CREAT SERPL-MCNC: 1.26 MG/DL (ref 0.6–1.3)
EOSINOPHIL # BLD AUTO: 0.05 THOUSAND/ΜL (ref 0–0.61)
EOSINOPHIL NFR BLD AUTO: 1 % (ref 0–6)
ERYTHROCYTE [DISTWIDTH] IN BLOOD BY AUTOMATED COUNT: 14.6 % (ref 11.6–15.1)
GFR SERPL CREATININE-BSD FRML MDRD: 92 ML/MIN/1.73SQ M
GLUCOSE P FAST SERPL-MCNC: 89 MG/DL (ref 65–99)
HCT VFR BLD AUTO: 49.2 % (ref 36.5–49.3)
HGB BLD-MCNC: 15.5 G/DL (ref 12–17)
IMM GRANULOCYTES # BLD AUTO: 0 THOUSAND/UL (ref 0–0.2)
IMM GRANULOCYTES NFR BLD AUTO: 0 % (ref 0–2)
LYMPHOCYTES # BLD AUTO: 1.61 THOUSANDS/ΜL (ref 0.6–4.47)
LYMPHOCYTES NFR BLD AUTO: 38 % (ref 14–44)
MCH RBC QN AUTO: 26.2 PG (ref 26.8–34.3)
MCHC RBC AUTO-ENTMCNC: 31.5 G/DL (ref 31.4–37.4)
MCV RBC AUTO: 83 FL (ref 82–98)
MONOCYTES # BLD AUTO: 0.56 THOUSAND/ΜL (ref 0.17–1.22)
MONOCYTES NFR BLD AUTO: 13 % (ref 4–12)
NEUTROPHILS # BLD AUTO: 2 THOUSANDS/ΜL (ref 1.85–7.62)
NEUTS SEG NFR BLD AUTO: 47 % (ref 43–75)
NRBC BLD AUTO-RTO: 0 /100 WBCS
PLATELET # BLD AUTO: 260 THOUSANDS/UL (ref 149–390)
PMV BLD AUTO: 10.9 FL (ref 8.9–12.7)
POTASSIUM SERPL-SCNC: 4.2 MMOL/L (ref 3.5–5.3)
PROT SERPL-MCNC: 8.2 G/DL (ref 6.4–8.2)
RBC # BLD AUTO: 5.91 MILLION/UL (ref 3.88–5.62)
SODIUM SERPL-SCNC: 141 MMOL/L (ref 136–145)
TSH SERPL DL<=0.05 MIU/L-ACNC: 0.68 UIU/ML (ref 0.36–3.74)
WBC # BLD AUTO: 4.26 THOUSAND/UL (ref 4.31–10.16)

## 2020-08-28 PROCEDURE — 84443 ASSAY THYROID STIM HORMONE: CPT

## 2020-08-28 PROCEDURE — 80053 COMPREHEN METABOLIC PANEL: CPT

## 2020-08-28 PROCEDURE — 36415 COLL VENOUS BLD VENIPUNCTURE: CPT

## 2020-08-28 PROCEDURE — 85025 COMPLETE CBC W/AUTO DIFF WBC: CPT

## 2020-12-11 ENCOUNTER — IMMUNIZATIONS (OUTPATIENT)
Dept: INTERNAL MEDICINE CLINIC | Facility: CLINIC | Age: 25
End: 2020-12-11
Payer: COMMERCIAL

## 2020-12-11 DIAGNOSIS — Z23 NEED FOR INFLUENZA VACCINATION: Primary | ICD-10-CM

## 2020-12-11 PROCEDURE — 90686 IIV4 VACC NO PRSV 0.5 ML IM: CPT

## 2020-12-11 PROCEDURE — 90471 IMMUNIZATION ADMIN: CPT

## 2021-01-29 DIAGNOSIS — Z23 ENCOUNTER FOR IMMUNIZATION: ICD-10-CM

## 2021-10-29 ENCOUNTER — IMMUNIZATIONS (OUTPATIENT)
Dept: FAMILY MEDICINE CLINIC | Facility: HOSPITAL | Age: 26
End: 2021-10-29

## 2021-10-29 DIAGNOSIS — Z23 ENCOUNTER FOR IMMUNIZATION: Primary | ICD-10-CM

## 2021-11-15 ENCOUNTER — OFFICE VISIT (OUTPATIENT)
Dept: INTERNAL MEDICINE CLINIC | Facility: CLINIC | Age: 26
End: 2021-11-15
Payer: COMMERCIAL

## 2021-11-15 VITALS
SYSTOLIC BLOOD PRESSURE: 116 MMHG | BODY MASS INDEX: 22.33 KG/M2 | TEMPERATURE: 97.5 F | DIASTOLIC BLOOD PRESSURE: 74 MMHG | HEART RATE: 76 BPM | WEIGHT: 156 LBS | HEIGHT: 70 IN | OXYGEN SATURATION: 96 %

## 2021-11-15 DIAGNOSIS — Z23 NEED FOR INFLUENZA VACCINATION: ICD-10-CM

## 2021-11-15 DIAGNOSIS — R42 LIGHTHEADEDNESS: Primary | ICD-10-CM

## 2021-11-15 PROCEDURE — 3008F BODY MASS INDEX DOCD: CPT

## 2021-11-15 PROCEDURE — 3725F SCREEN DEPRESSION PERFORMED: CPT

## 2021-11-15 PROCEDURE — 90686 IIV4 VACC NO PRSV 0.5 ML IM: CPT

## 2021-11-15 PROCEDURE — 1036F TOBACCO NON-USER: CPT

## 2021-11-15 PROCEDURE — 90471 IMMUNIZATION ADMIN: CPT

## 2021-11-15 PROCEDURE — 99213 OFFICE O/P EST LOW 20 MIN: CPT

## 2022-11-16 ENCOUNTER — TELEPHONE (OUTPATIENT)
Dept: INTERNAL MEDICINE CLINIC | Facility: CLINIC | Age: 27
End: 2022-11-16

## 2023-06-20 ENCOUNTER — OFFICE VISIT (OUTPATIENT)
Dept: URGENT CARE | Facility: CLINIC | Age: 28
End: 2023-06-20
Payer: COMMERCIAL

## 2023-06-20 VITALS
HEIGHT: 70 IN | RESPIRATION RATE: 16 BRPM | OXYGEN SATURATION: 99 % | BODY MASS INDEX: 22.9 KG/M2 | SYSTOLIC BLOOD PRESSURE: 132 MMHG | HEART RATE: 89 BPM | TEMPERATURE: 98.1 F | DIASTOLIC BLOOD PRESSURE: 63 MMHG | WEIGHT: 160 LBS

## 2023-06-20 DIAGNOSIS — G43.809 OTHER MIGRAINE WITHOUT STATUS MIGRAINOSUS, NOT INTRACTABLE: Primary | ICD-10-CM

## 2023-06-20 PROCEDURE — 99213 OFFICE O/P EST LOW 20 MIN: CPT | Performed by: PHYSICIAN ASSISTANT

## 2023-06-20 NOTE — LETTER
June 20, 2023     Patient: Preeti Kennedy II   YOB: 1995   Date of Visit: 6/20/2023       To Whom It May Concern: It is my medical opinion that Preeti Kennedy may return to work on 06/21/2023       If you have any questions or concerns, please don't hesitate to call           Sincerely,        Anna Eddy PA-C    CC: No Recipients

## 2023-06-20 NOTE — PROGRESS NOTES
[id number]        NAME: Caroline Waddell is a 34 y o  male  : 1995    MRN: [id number]  DATE: 2023  TIME: 7:03 PM    Assessment and Plan   Other migraine without status migrainosus, not intractable [G43 [id number]]  1  Other migraine without status migrainosus, not intractable        Pt presents with complaint of migraine headache  Reports he has not had migraines before, no focal deficits or neurologic abnormalities  Offered Toradol injection vs Ketorolac rx vs Excedrin Migraine over the counter  Pt opts for OTC Excedrin Migraine  Needs note for work for today  Patient Instructions     Patient Instructions   Excedrin Migraine over the counter as instructed on packaging  If symptoms do not improve within 1-2 hours of Excedrin Migraine or if you develop fever (temp > 100 4) or any new or worsening symptoms, report to the emergency department immediately  Follow up with PCP in 3-5 days  Proceed to  ER if symptoms worsen  Chief Complaint     Chief Complaint   Patient presents with   • Migraine     Headache for the past 4 days, fever  Progressively getting worse  OTC Tylenol          History of Present Illness       34year old male presents with complaint of     Migraine  This is a new problem  The current episode started in the past 7 days  The problem occurs intermittently  The problem has been waxing and waning since onset  The pain is present in the left unilateral  The pain does not radiate  The pain quality is not similar to prior headaches  The quality of the pain is described as pulsating  The pain is at a severity of 7/10  The pain is moderate  Associated symptoms include phonophobia, photophobia and a sore throat (intermittent x 2 days)   Pertinent negatives include no abdominal pain, blurred vision, coughing, dizziness, eye watering, fever (but elevated temp 99 7 earlier today), nausea, rhinorrhea, seizures, sinus pressure, swollen glands, tingling, tinnitus, visual "change, vomiting or weakness  The symptoms are aggravated by bright light and noise  Past treatments include acetaminophen  The treatment provided mild relief  Review of Systems   Review of Systems   Constitutional: Negative for fever (but elevated temp 99 7 earlier today)  HENT: Positive for sore throat (intermittent x 2 days)  Negative for rhinorrhea, sinus pressure and tinnitus  Eyes: Positive for photophobia  Negative for blurred vision  Respiratory: Negative for cough  Gastrointestinal: Negative for abdominal pain, nausea and vomiting  Neurological: Negative for dizziness, tingling, seizures and weakness  Current Medications     No current outpatient medications on file  Current Allergies     Allergies as of 06/20/2023   • (No Known Allergies)            The following portions of the patient's history were reviewed and updated as appropriate: allergies, current medications, past family history, past medical history, past social history, past surgical history and problem list      No past medical history on file  Past Surgical History:   Procedure Laterality Date   • NO PAST SURGERIES         Family History   Problem Relation Age of Onset   • Hypertension Mother    • Diabetes Father    • Hypertension Father    • Hypertension Brother    • Other Family         Cardiac Disorder   • Stroke Family    • Diabetes Family    • Hypertension Family    • Cancer Family    • Other Family         Cardiac Disorder   • Diabetes Family    • Hypertension Family    • Cancer Family    • Other Family         Cardiac Disorder   • Stroke Family    • Diabetes Family    • Pancreatic cancer Family    • Alcohol abuse Neg Hx    • Substance Abuse Neg Hx    • Mental illness Neg Hx    • Depression Neg Hx    • Heart attack Neg Hx          Medications have been verified          Objective   /63   Pulse 89   Temp 98 1 °F (36 7 °C)   Resp 16   Ht 5' 10\" (1 778 m)   Wt 72 6 kg (160 lb)   SpO2 99%   BMI " 22 96 kg/m²   No LMP for male patient  Physical Exam     Physical Exam  Vitals and nursing note reviewed  Constitutional:       General: He is awake  He is not in acute distress  Appearance: Normal appearance  He is well-developed and well-groomed  He is not ill-appearing, toxic-appearing or diaphoretic  HENT:      Head: Normocephalic and atraumatic  Right Ear: Hearing and external ear normal       Left Ear: Hearing and external ear normal       Nose: Rhinorrhea present  Rhinorrhea is clear  Right Turbinates: Swollen and pale  Left Turbinates: Swollen and pale  Mouth/Throat:      Pharynx: Posterior oropharyngeal erythema present  Tonsils: No tonsillar exudate (right tonsolith present)  2+ on the right  2+ on the left  Comments: Postnasal drip present in the posterior oropharynx  Eyes:      General: Lids are normal  Vision grossly intact  Gaze aligned appropriately  Pupils: Pupils are equal, round, and reactive to light  Pupils are equal       Right eye: Pupil is round, reactive and not sluggish  Left eye: Pupil is round, reactive and not sluggish  Funduscopic exam:     Right eye: No hemorrhage, exudate, AV nicking, arteriolar narrowing or papilledema  Red reflex and venous pulsations present  Left eye: No hemorrhage, exudate, AV nicking, arteriolar narrowing or papilledema  Red reflex and venous pulsations present  Cardiovascular:      Rate and Rhythm: Normal rate  Pulmonary:      Effort: Pulmonary effort is normal       Comments: Patient is speaking in full sentences with no increased respiratory effort  No audible wheezing or stridor  Musculoskeletal:      Cervical back: Normal range of motion  Lymphadenopathy:      Cervical: No cervical adenopathy  Skin:     General: Skin is warm and dry  Neurological:      General: No focal deficit present  Mental Status: He is alert and oriented to person, place, and time        GCS: GCS eye "subscore is 4  GCS verbal subscore is 5  GCS motor subscore is 6  Cranial Nerves: Cranial nerves 2-12 are intact  Sensory: Sensation is intact  Motor: Motor function is intact  Coordination: Coordination is intact  Gait: Gait is intact  Deep Tendon Reflexes: Reflexes are normal and symmetric  Psychiatric:         Attention and Perception: Attention and perception normal          Mood and Affect: Mood and affect normal          Speech: Speech normal          Behavior: Behavior normal  Behavior is cooperative  Note: Portions of this record may have been created with voice recognition software  Occasional wrong word or \"sound a like\" substitutions may have occurred due to the inherent limitations of voice recognition software  Please read the chart carefully and recognize, using context, where substitutions have occurred  *      "

## 2023-06-20 NOTE — PATIENT INSTRUCTIONS
Excedrin Migraine over the counter as instructed on packaging  If symptoms do not improve within 1-2 hours of Excedrin Migraine or if you develop fever (temp > 100 4) or any new or worsening symptoms, report to the emergency department immediately

## 2025-05-09 NOTE — PROGRESS NOTES
Assessment/Plan:       Diagnoses and all orders for this visit:    Acute pain of right knee  Comments:  suspect sprain, exacerbated by exercise and cont'd exertional activity  x-ray now and rest, elastic knee brace with nsaids and HEP   t/c PT if not improved  Orders:  -     XR knee 4+ vw right injury; Future  -     ibuprofen (MOTRIN) 400 mg tablet; Take 1 tablet (400 mg total) by mouth every 12 (twelve) hours for 5 days Take with food      pt requests work excuse for today and tmrw 2nd to worsening of pain last night into today, note provided    Subjective:      Patient ID: Koffi De La Rosa is a 26 y o  male  HPI    Here with c/o right knee pain off/on for 2 weeks  Sx started after playing falg football but no fall or known injury  Pain occurs with waking/standing but no previous knee surgery or problem in past   He cont'd to exercise, 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) and play FF till yesterday when pain became worse after most recent match  No swelling, redness or fever  No other complaints  No past medical history on file  Vitals:    04/30/18 1431   BP: 128/72   Pulse: (!) 54   Resp: 16   SpO2: 98%   Weight: 69 8 kg (153 lb 12 8 oz)   Height: 5' 9" (1 753 m)     Body mass index is 22 71 kg/m²  Current Outpatient Prescriptions:     ibuprofen (MOTRIN) 400 mg tablet, Take 1 tablet (400 mg total) by mouth every 12 (twelve) hours for 5 days Take with food, Disp: 30 tablet, Rfl: 0  No Known Allergies      Review of Systems   Constitutional: Negative for fever  Respiratory: Negative for shortness of breath  Cardiovascular: Negative for chest pain  Gastrointestinal: Negative for abdominal pain  Musculoskeletal: Positive for arthralgias and joint swelling  Skin: Negative for rash  Psychiatric/Behavioral: Negative for dysphoric mood           Objective:      /72   Pulse (!) 54   Resp 16   Ht 5' 9" (1 753 m)   Wt 69 8 kg (153 lb 12 8 oz)   SpO2 98%   BMI 22 71 kg/m² What Type Of Note Output Would You Prefer (Optional)?: Standard Output Hpi Title: Evaluation of Skin Lesions Physical Exam   Constitutional: He is oriented to person, place, and time  He appears well-developed and well-nourished  HENT:   Head: Normocephalic and atraumatic  Eyes: Conjunctivae are normal  Pupils are equal, round, and reactive to light  Cardiovascular: Normal rate, regular rhythm and normal heart sounds  No murmur heard  Pulmonary/Chest: Effort normal and breath sounds normal  He has no wheezes  He has no rales  Abdominal: Soft  Bowel sounds are normal  There is no tenderness  Musculoskeletal: He exhibits no edema  Right knee: He exhibits no swelling and no effusion  Tenderness (in posterior knee without swelling or palpable bulge) found  No pain on passive ROM of right knee, lachman's test neg right knee   Neurological: He is alert and oriented to person, place, and time  Psychiatric: He has a normal mood and affect  His behavior is normal    Vitals reviewed  How Severe Are Your Spot(S)?: moderate Have Your Spot(S) Been Treated In The Past?: has not been treated